# Patient Record
Sex: FEMALE | Race: WHITE | Employment: FULL TIME | URBAN - METROPOLITAN AREA
[De-identification: names, ages, dates, MRNs, and addresses within clinical notes are randomized per-mention and may not be internally consistent; named-entity substitution may affect disease eponyms.]

---

## 2020-08-24 ENCOUNTER — HOSPITAL ENCOUNTER (OUTPATIENT)
Facility: CLINIC | Age: 55
End: 2020-08-24
Attending: COLON & RECTAL SURGERY | Admitting: COLON & RECTAL SURGERY
Payer: COMMERCIAL

## 2020-08-25 DIAGNOSIS — Z11.59 ENCOUNTER FOR SCREENING FOR OTHER VIRAL DISEASES: Primary | ICD-10-CM

## 2020-10-31 ENCOUNTER — HOSPITAL ENCOUNTER (EMERGENCY)
Facility: CLINIC | Age: 55
Discharge: HOME | End: 2020-10-31
Attending: EMERGENCY MEDICINE | Admitting: EMERGENCY MEDICINE
Payer: COMMERCIAL

## 2020-10-31 ENCOUNTER — APPOINTMENT (OUTPATIENT)
Dept: CT IMAGING | Facility: CLINIC | Age: 55
End: 2020-10-31
Attending: EMERGENCY MEDICINE
Payer: COMMERCIAL

## 2020-10-31 VITALS
SYSTOLIC BLOOD PRESSURE: 128 MMHG | RESPIRATION RATE: 15 BRPM | HEART RATE: 74 BPM | TEMPERATURE: 97.6 F | DIASTOLIC BLOOD PRESSURE: 74 MMHG | OXYGEN SATURATION: 98 %

## 2020-10-31 DIAGNOSIS — R10.13 ABDOMINAL PAIN, EPIGASTRIC: ICD-10-CM

## 2020-10-31 DIAGNOSIS — K80.20 CALCULUS OF GALLBLADDER WITHOUT CHOLECYSTITIS WITHOUT OBSTRUCTION: ICD-10-CM

## 2020-10-31 LAB
ALBUMIN SERPL-MCNC: 3.4 G/DL (ref 3.4–5)
ALP SERPL-CCNC: 82 U/L (ref 40–150)
ALT SERPL W P-5'-P-CCNC: 20 U/L (ref 0–50)
ANION GAP SERPL CALCULATED.3IONS-SCNC: 6 MMOL/L (ref 3–14)
AST SERPL W P-5'-P-CCNC: 17 U/L (ref 0–45)
BASOPHILS # BLD AUTO: 0 10E9/L (ref 0–0.2)
BASOPHILS NFR BLD AUTO: 0.2 %
BILIRUB SERPL-MCNC: 0.4 MG/DL (ref 0.2–1.3)
BUN SERPL-MCNC: 10 MG/DL (ref 7–30)
CALCIUM SERPL-MCNC: 8.8 MG/DL (ref 8.5–10.1)
CHLORIDE SERPL-SCNC: 108 MMOL/L (ref 94–109)
CO2 SERPL-SCNC: 26 MMOL/L (ref 20–32)
CREAT SERPL-MCNC: 0.65 MG/DL (ref 0.52–1.04)
DIFFERENTIAL METHOD BLD: NORMAL
EOSINOPHIL # BLD AUTO: 0.1 10E9/L (ref 0–0.7)
EOSINOPHIL NFR BLD AUTO: 0.5 %
ERYTHROCYTE [DISTWIDTH] IN BLOOD BY AUTOMATED COUNT: 11.9 % (ref 10–15)
GFR SERPL CREATININE-BSD FRML MDRD: >90 ML/MIN/{1.73_M2}
GLUCOSE SERPL-MCNC: 121 MG/DL (ref 70–99)
HCT VFR BLD AUTO: 38.9 % (ref 35–47)
HGB BLD-MCNC: 12.8 G/DL (ref 11.7–15.7)
IMM GRANULOCYTES # BLD: 0 10E9/L (ref 0–0.4)
IMM GRANULOCYTES NFR BLD: 0.2 %
LIPASE SERPL-CCNC: 89 U/L (ref 73–393)
LYMPHOCYTES # BLD AUTO: 1.4 10E9/L (ref 0.8–5.3)
LYMPHOCYTES NFR BLD AUTO: 14.8 %
MCH RBC QN AUTO: 30.6 PG (ref 26.5–33)
MCHC RBC AUTO-ENTMCNC: 32.9 G/DL (ref 31.5–36.5)
MCV RBC AUTO: 93 FL (ref 78–100)
MONOCYTES # BLD AUTO: 0.5 10E9/L (ref 0–1.3)
MONOCYTES NFR BLD AUTO: 5.2 %
NEUTROPHILS # BLD AUTO: 7.4 10E9/L (ref 1.6–8.3)
NEUTROPHILS NFR BLD AUTO: 79.1 %
NRBC # BLD AUTO: 0 10*3/UL
NRBC BLD AUTO-RTO: 0 /100
PLATELET # BLD AUTO: 227 10E9/L (ref 150–450)
POTASSIUM SERPL-SCNC: 4 MMOL/L (ref 3.4–5.3)
PROT SERPL-MCNC: 7 G/DL (ref 6.8–8.8)
RBC # BLD AUTO: 4.18 10E12/L (ref 3.8–5.2)
SODIUM SERPL-SCNC: 140 MMOL/L (ref 133–144)
TROPONIN I SERPL-MCNC: <0.015 UG/L (ref 0–0.04)
WBC # BLD AUTO: 9.4 10E9/L (ref 4–11)

## 2020-10-31 PROCEDURE — 85025 COMPLETE CBC W/AUTO DIFF WBC: CPT | Performed by: EMERGENCY MEDICINE

## 2020-10-31 PROCEDURE — 250N000009 HC RX 250: Performed by: EMERGENCY MEDICINE

## 2020-10-31 PROCEDURE — 80053 COMPREHEN METABOLIC PANEL: CPT | Performed by: EMERGENCY MEDICINE

## 2020-10-31 PROCEDURE — 74177 CT ABD & PELVIS W/CONTRAST: CPT

## 2020-10-31 PROCEDURE — 99285 EMERGENCY DEPT VISIT HI MDM: CPT | Mod: 25

## 2020-10-31 PROCEDURE — 250N000011 HC RX IP 250 OP 636: Performed by: EMERGENCY MEDICINE

## 2020-10-31 PROCEDURE — 250N000013 HC RX MED GY IP 250 OP 250 PS 637: Performed by: EMERGENCY MEDICINE

## 2020-10-31 PROCEDURE — 93005 ELECTROCARDIOGRAM TRACING: CPT

## 2020-10-31 PROCEDURE — 83690 ASSAY OF LIPASE: CPT | Performed by: EMERGENCY MEDICINE

## 2020-10-31 PROCEDURE — 84484 ASSAY OF TROPONIN QUANT: CPT | Performed by: EMERGENCY MEDICINE

## 2020-10-31 RX ORDER — IOPAMIDOL 755 MG/ML
100 INJECTION, SOLUTION INTRAVASCULAR ONCE
Status: COMPLETED | OUTPATIENT
Start: 2020-10-31 | End: 2020-10-31

## 2020-10-31 RX ORDER — SIMETHICONE 80 MG
80 TABLET,CHEWABLE ORAL EVERY 6 HOURS PRN
Qty: 30 TABLET | Refills: 0 | Status: SHIPPED | OUTPATIENT
Start: 2020-10-31

## 2020-10-31 RX ORDER — SENNA AND DOCUSATE SODIUM 50; 8.6 MG/1; MG/1
1 TABLET, FILM COATED ORAL 2 TIMES DAILY PRN
Qty: 30 TABLET | Refills: 0 | Status: SHIPPED | OUTPATIENT
Start: 2020-10-31

## 2020-10-31 RX ADMIN — IOPAMIDOL 100 ML: 755 INJECTION, SOLUTION INTRAVENOUS at 08:28

## 2020-10-31 RX ADMIN — LIDOCAINE HYDROCHLORIDE 30 ML: 20 SOLUTION ORAL; TOPICAL at 07:44

## 2020-10-31 ASSESSMENT — ENCOUNTER SYMPTOMS
NAUSEA: 0
CHILLS: 1
FEVER: 0
BLOOD IN STOOL: 0
VOMITING: 0
SHORTNESS OF BREATH: 0
ABDOMINAL PAIN: 1

## 2020-10-31 NOTE — ED AVS SNAPSHOT
[facility]  [address]  [facility] [address]  Phone: [phone]  Fax: [phone]                                    Kristie Perez   MRN: [id number]    Department: [facility] Emergency Dept   Date of Visit: 10/31/2020           After Visit Summary Signature Page    I have received my discharge instructions, and my questions have been answered. I have discussed any challenges I see with this plan with the nurse or doctor.    ..........................................................................................................................................  Patient/Patient Representative Signature      ..........................................................................................................................................  Patient Representative Print Name and Relationship to Patient    ..................................................               ................................................  Date                                   Time    ..........................................................................................................................................  Reviewed by Signature/Title    ...................................................              ..............................................  Date                                               Time          22EPIC Rev 08/18

## 2020-10-31 NOTE — ED PROVIDER NOTES
"   History   Chief Complaint  Abdominal Pain    HPI   Kristie Perez is a 55 year old female with a history of GERD, Anxiety, hypertension, hyperlipidemia, obesity, and diabetes mellitus who presents for evaluation of upper abdominal pain after having screening colonoscopy yesterday.  Patient states that colonoscopy otherwise went well and had a single polyp removed and was told she had diverticulosis.  She went home and had a meal and went to bed.  She woke up with upper abdominal pain that felt like \"somebody punched you in the stomach\".  This is otherwise been constant and nonradiating.  She has tried Ester-Melvin, MiraLAX, Pepto-Bismol with minimal relief.  She tried Advil at midnight which seemed to help a little bit.  She contacted the on-call gastroenterologist who told her to present to the ER if she developed any chills.  This morning, patient states that she did have chills but denies any fevers which is why she presents to the ER.  She states that she has been passing gas but has not had a bowel movement.  She denies any nausea/vomiting, chest pain, shortness of breath, bleeding from the rectum, urinary symptoms.    Allergies  Pollen Extracts    Medications  Albuterol  Zocor  Lisinopril  Celexa  Glucophage  Ionamin    Past Medical History  GERD  Anxiety  Hypertension  Obesity  Hyperlipidemia  Diabetes mellitus    Past Surgical History  D and C  Colonocopy    Family History  Diabetes  CAD  Hypertension  Hyperlipidemia  Colon cancer    Social History  Tobacco use: never smoker  Alcohol use: yes  Drug use: no  Marital Status:     Review of Systems   Constitutional: Positive for chills. Negative for fever.   Respiratory: Negative for shortness of breath.    Cardiovascular: Negative for chest pain.   Gastrointestinal: Positive for abdominal pain. Negative for blood in stool, nausea and vomiting.   Genitourinary: Negative.    All other systems reviewed and are negative.      Physical Exam     Patient " Vitals for the past 24 hrs:   BP Temp Pulse Resp SpO2   10/31/20 0930 128/74 -- 74 -- --   10/31/20 0900 114/77 -- 67 -- 98 %   10/31/20 0800 124/78 -- 69 -- --   10/31/20 0745 (!) 120/99 -- 73 -- 98 %   10/31/20 0730 (!) 147/89 -- 78 -- 97 %   10/31/20 0727 (!) 156/89 -- -- 15 97 %   10/31/20 0711 (!) 156/102 97.6  F (36.4  C) 90 18 99 %       Physical Exam    General: Alert, no acute distress; nontoxic appearing  HEENT:  Moist mucous membranes.   Conjunctiva normal.   CV:  RRR, no m/r/g, skin warm and well perfused  Pulm:  CTAB, no wheezes/ronchi/rales.  No acute distress, breathing comfortably  GI:  Soft, mild epigastric tenderness, nondistended.  No rebound or guarding.  Normal bowel sounds  MSK:  Moving all extremities.  No focal areas of edema, erythema, or tenderness  Skin:  WWP, no rashes, no lower extremity edema, skin color normal, no diaphoresis  Psych:  Well-appearing, normal affect, regular speech      Emergency Department Course   EKG  Indication: Chest Pain Equivalent  Time: 0954  Rate 58 bpm. SD interval 164. QRS duration 90. QT/QTc 456/447.   Sinus bradycardia  Cannot rule out inferior infarct, age undetermined  Abnormal ECG   No prior EKG for comparison.  Read time: 0954  Read by Yahir Aldrich MD    Imaging:  Radiology findings were communicated with the patient who voiced understanding of the findings.    Abd/pelvis CT,  IV  contrast only TRAUMA / AAA  IMPRESSION:   1.  Cholelithiasis with mild gallbladder distention and trace  pericholecystic fluid. Findings raise the possibility of  cholecystitis. Consider right upper quadrant ultrasound if clinically  indicated.  2.  No bowel obstruction. No free air.    Readings per Radiology    Laboratory:  Laboratory findings were communicated with the patient who voiced understanding of the findings.    CBC: WBC: 9.4, HGB: 12.8, PLT: 227  CMP: Glucose 121 (high), o/w WNL (Creatinine: 0.65)  Lipase: 89  Troponin I (Collected at 0739):  <0.015    Interventions:  0744 GI Cocktail 30 mLs PO    Emergency Department Course:  Past medical records, nursing notes, and vitals reviewed.    0717 I physically examined the patient as documented above.    EKG obtained in the ED, see results above.   IV was inserted and blood was drawn for laboratory testing, results above.  The patient was sent for radiographs while in the emergency department, results above.     0902 I rechecked the patient and discussed the findings of their workup thus far.     Findings and plan explained to the Patient. Patient discharged home with instructions regarding supportive care, medications, and reasons to return. The importance of close follow-up was reviewed. The patient was prescribed senna-docusate and simethicone.    I personally reviewed the laboratory and imaging results with the Patient and answered all related questions prior to discharge.     Impression & Plan   Medical Decision Making:  Kristie Perez is a 55 year old female who presents to the ER for evaluation of epigastric abdominal pain status post colonoscopy yesterday.  Please see HPI for specifics.  Patient developed chills this morning and was told to present to the ER if she developed this with her abdominal pain.  She is otherwise afebrile and vitally stable.  Broad differential was considered includes but is not limited to gastritis/esophagitis, pancreatitis, colitis, perforation, atypical ACS amongst other things.  Fortunately EKG shows no signs of acute ischemia or infarct and troponin is within normal limits despite greater than 6 hours of symptoms.  She has mild epigastric tenderness without any guarding or rebound on CT without signs significant for obstruction or free air.  There is noted to be cholelithiasis with gallbladder wall thickening pericholecystic fluid, however, patient has no right upper quadrant tenderness on repeat examination and had improved symptoms with GI cocktail.  After discussion of  CT abdomen/pelvis and repeat examination with the patient, we both agreed to defer ultrasound as I have low suspicion for cholecystitis.  The rest of her lab studies are normal including LFT.  Given improving symptoms and overall reassuring work-up, I feel she is safe to discharge home and she agrees.  We will trial simethicone and she will add senna in addition to taking her MiraLAX at home.  Reasons to return to the ER were discussed with the patient.  All questions were answered prior to discharge    Diagnosis:    ICD-10-CM    1. Abdominal pain, epigastric  R10.13    2. Calculus of gallbladder without cholecystitis without obstruction  K80.20        Disposition:  Discharged to home.    Discharge Medications:  Discharge Medication List as of 10/31/2020  9:37 AM      START taking these medications    Details   SENNA-docusate sodium (SENNA S) 8.6-50 MG tablet Take 1 tablet by mouth 2 times daily as needed (Constipation), Disp-30 tablet, R-0, Local Print      simethicone (MYLICON) 80 MG chewable tablet Take 1 tablet (80 mg) by mouth every 6 hours as needed for flatulence or cramping, Disp-30 tablet, R-0, Local Print             Scribe Disclosure:  I, Fritz Carlton, am serving as a scribe at 7:17 AM on 10/31/2020 to document services personally performed by Yahir Aldrich MD based on my observations and the provider's statements to me.      Yahir Aldrich MD  10/31/20 7020

## 2020-10-31 NOTE — ED TRIAGE NOTES
Patient presents to the ED with upper abdominal pain. States had a colonoscopy yesterday and later in the day began having severe upper abdominal pain.

## 2020-11-01 LAB — INTERPRETATION ECG - MUSE: NORMAL
